# Patient Record
Sex: FEMALE | Race: WHITE
[De-identification: names, ages, dates, MRNs, and addresses within clinical notes are randomized per-mention and may not be internally consistent; named-entity substitution may affect disease eponyms.]

---

## 2020-10-11 ENCOUNTER — HOSPITAL ENCOUNTER (EMERGENCY)
Dept: HOSPITAL 8 - ED | Age: 30
Discharge: HOME | End: 2020-10-11
Payer: MEDICAID

## 2020-10-11 VITALS — BODY MASS INDEX: 35.25 KG/M2 | WEIGHT: 219.36 LBS | HEIGHT: 66 IN

## 2020-10-11 VITALS — DIASTOLIC BLOOD PRESSURE: 69 MMHG | SYSTOLIC BLOOD PRESSURE: 113 MMHG

## 2020-10-11 DIAGNOSIS — R10.31: ICD-10-CM

## 2020-10-11 DIAGNOSIS — R19.03: Primary | ICD-10-CM

## 2020-10-11 LAB
ALBUMIN SERPL-MCNC: 3.3 G/DL (ref 3.4–5)
ANION GAP SERPL CALC-SCNC: 3 MMOL/L (ref 5–15)
BASOPHILS # BLD AUTO: 0.1 X10^3/UL (ref 0–0.1)
BASOPHILS NFR BLD AUTO: 1 % (ref 0–1)
CALCIUM SERPL-MCNC: 8.3 MG/DL (ref 8.5–10.1)
CHLORIDE SERPL-SCNC: 113 MMOL/L (ref 98–107)
CREAT SERPL-MCNC: 0.84 MG/DL (ref 0.55–1.02)
EOSINOPHIL # BLD AUTO: 0.1 X10^3/UL (ref 0–0.4)
EOSINOPHIL NFR BLD AUTO: 1 % (ref 1–7)
ERYTHROCYTE [DISTWIDTH] IN BLOOD BY AUTOMATED COUNT: 13.2 % (ref 9.6–15.2)
LYMPHOCYTES # BLD AUTO: 2.7 X10^3/UL (ref 1–3.4)
LYMPHOCYTES NFR BLD AUTO: 31 % (ref 22–44)
MCH RBC QN AUTO: 29.7 PG (ref 27–34.8)
MCHC RBC AUTO-ENTMCNC: 33.8 G/DL (ref 32.4–35.8)
MD: (no result)
MICROSCOPIC: (no result)
MONOCYTES # BLD AUTO: 0.7 X10^3/UL (ref 0.2–0.8)
MONOCYTES NFR BLD AUTO: 8 % (ref 2–9)
NEUTROPHILS # BLD AUTO: 5.2 X10^3/UL (ref 1.8–6.8)
NEUTROPHILS NFR BLD AUTO: 59 % (ref 42–75)
PLATELET # BLD AUTO: 289 X10^3/UL (ref 130–400)
PMV BLD AUTO: 9.5 FL (ref 7.4–10.4)
RBC # BLD AUTO: 4.51 X10^6/UL (ref 3.82–5.3)

## 2020-10-11 PROCEDURE — 81001 URINALYSIS AUTO W/SCOPE: CPT

## 2020-10-11 PROCEDURE — 96374 THER/PROPH/DIAG INJ IV PUSH: CPT

## 2020-10-11 PROCEDURE — 80048 BASIC METABOLIC PNL TOTAL CA: CPT

## 2020-10-11 PROCEDURE — 87086 URINE CULTURE/COLONY COUNT: CPT

## 2020-10-11 PROCEDURE — 84703 CHORIONIC GONADOTROPIN ASSAY: CPT

## 2020-10-11 PROCEDURE — 82040 ASSAY OF SERUM ALBUMIN: CPT

## 2020-10-11 PROCEDURE — 36415 COLL VENOUS BLD VENIPUNCTURE: CPT

## 2020-10-11 PROCEDURE — 96375 TX/PRO/DX INJ NEW DRUG ADDON: CPT

## 2020-10-11 PROCEDURE — 85025 COMPLETE CBC W/AUTO DIFF WBC: CPT

## 2020-10-11 PROCEDURE — 87077 CULTURE AEROBIC IDENTIFY: CPT

## 2020-10-11 PROCEDURE — 87186 SC STD MICRODIL/AGAR DIL: CPT

## 2020-10-11 PROCEDURE — 99284 EMERGENCY DEPT VISIT MOD MDM: CPT

## 2020-10-11 NOTE — NUR
Pt reports mild improvement of pain after meds. Pt resting in bed watching TV, 
NADN, denies other needs.

## 2020-10-11 NOTE — NUR
First contact with pt. Bel CHILEL at bedside to gill pt. Pt reports "growth" 
on R side of  scar x8 years. Pt reports increased pain with movement 
as well. Pt denies N/V/D or urinary difficulty. Pt reports has an appointment 
with OB 10/20/20 to be evaluated for this. Pt in hospital gown, resting in bed, 
NADN, watching TV. Continuous oxygen and BP Monitors applied, all safety 
measures observed.

## 2020-10-11 NOTE — NUR
Bel PA at bedside to discuss POC with pt. Pt resting in bed, NADN, watching 
TV, denies other needs.

## 2020-10-20 ENCOUNTER — HOSPITAL ENCOUNTER (OUTPATIENT)
Dept: HOSPITAL 8 - OUT | Age: 30
Discharge: HOME | End: 2020-10-20
Attending: SURGERY
Payer: MEDICAID

## 2020-10-20 VITALS — DIASTOLIC BLOOD PRESSURE: 73 MMHG | SYSTOLIC BLOOD PRESSURE: 111 MMHG

## 2020-10-20 VITALS — HEIGHT: 66 IN | BODY MASS INDEX: 33.8 KG/M2 | WEIGHT: 210.32 LBS

## 2020-10-20 DIAGNOSIS — Z98.890: ICD-10-CM

## 2020-10-20 DIAGNOSIS — R19.07: Primary | ICD-10-CM

## 2020-10-20 DIAGNOSIS — Z20.828: ICD-10-CM

## 2020-10-20 DIAGNOSIS — F12.90: ICD-10-CM

## 2020-10-20 DIAGNOSIS — Z82.5: ICD-10-CM

## 2020-10-20 DIAGNOSIS — Z72.89: ICD-10-CM

## 2020-10-20 DIAGNOSIS — F17.210: ICD-10-CM

## 2020-10-20 DIAGNOSIS — N80.8: ICD-10-CM

## 2020-10-20 LAB — HCG UR SG: 1.02 (ref 1–1.03)

## 2020-10-20 PROCEDURE — 81025 URINE PREGNANCY TEST: CPT

## 2020-10-20 PROCEDURE — 22901 EXC ABDL TUM DEEP 5 CM/>: CPT

## 2020-10-20 PROCEDURE — 87635 SARS-COV-2 COVID-19 AMP PRB: CPT

## 2020-10-20 PROCEDURE — 88307 TISSUE EXAM BY PATHOLOGIST: CPT

## 2020-10-20 RX ADMIN — OXYCODONE HYDROCHLORIDE PRN MG: 5 SOLUTION ORAL at 15:57

## 2020-10-20 RX ADMIN — OXYCODONE HYDROCHLORIDE PRN MG: 5 SOLUTION ORAL at 15:42

## 2020-10-20 RX ADMIN — FENTANYL CITRATE PRN MCG: 50 INJECTION INTRAMUSCULAR; INTRAVENOUS at 15:54

## 2020-10-20 RX ADMIN — FENTANYL CITRATE PRN MCG: 50 INJECTION INTRAMUSCULAR; INTRAVENOUS at 15:47

## 2020-10-20 RX ADMIN — FENTANYL CITRATE PRN MCG: 50 INJECTION INTRAMUSCULAR; INTRAVENOUS at 16:04

## 2020-10-20 RX ADMIN — FENTANYL CITRATE PRN MCG: 50 INJECTION INTRAMUSCULAR; INTRAVENOUS at 15:36
